# Patient Record
Sex: FEMALE | Race: WHITE
[De-identification: names, ages, dates, MRNs, and addresses within clinical notes are randomized per-mention and may not be internally consistent; named-entity substitution may affect disease eponyms.]

---

## 2017-10-20 ENCOUNTER — APPOINTMENT (OUTPATIENT)
Dept: DERMATOLOGY | Facility: CLINIC | Age: 82
End: 2017-10-20
Payer: MEDICARE

## 2017-10-20 PROCEDURE — 99213 OFFICE O/P EST LOW 20 MIN: CPT

## 2018-01-15 ENCOUNTER — APPOINTMENT (OUTPATIENT)
Dept: DERMATOLOGY | Facility: CLINIC | Age: 83
End: 2018-01-15
Payer: MEDICARE

## 2018-01-15 PROCEDURE — 99213 OFFICE O/P EST LOW 20 MIN: CPT

## 2018-05-14 ENCOUNTER — APPOINTMENT (OUTPATIENT)
Dept: DERMATOLOGY | Facility: CLINIC | Age: 83
End: 2018-05-14
Payer: MEDICARE

## 2018-05-14 PROCEDURE — 99213 OFFICE O/P EST LOW 20 MIN: CPT

## 2018-12-04 ENCOUNTER — OTHER (OUTPATIENT)
Age: 83
End: 2018-12-04

## 2018-12-04 DIAGNOSIS — E04.2 NONTOXIC MULTINODULAR GOITER: ICD-10-CM

## 2018-12-19 ENCOUNTER — APPOINTMENT (OUTPATIENT)
Dept: ENDOCRINOLOGY | Facility: CLINIC | Age: 83
End: 2018-12-19

## 2019-01-03 ENCOUNTER — RECORD ABSTRACTING (OUTPATIENT)
Age: 84
End: 2019-01-03

## 2019-01-03 DIAGNOSIS — Z86.69 PERSONAL HISTORY OF OTHER DISEASES OF THE NERVOUS SYSTEM AND SENSE ORGANS: ICD-10-CM

## 2019-01-03 DIAGNOSIS — Z86.39 PERSONAL HISTORY OF OTHER ENDOCRINE, NUTRITIONAL AND METABOLIC DISEASE: ICD-10-CM

## 2019-01-03 DIAGNOSIS — M81.0 AGE-RELATED OSTEOPOROSIS W/OUT CURRENT PATHOLOGICAL FRACTURE: ICD-10-CM

## 2019-01-03 DIAGNOSIS — Z86.79 PERSONAL HISTORY OF OTHER DISEASES OF THE CIRCULATORY SYSTEM: ICD-10-CM

## 2019-01-03 DIAGNOSIS — Z87.19 PERSONAL HISTORY OF OTHER DISEASES OF THE DIGESTIVE SYSTEM: ICD-10-CM

## 2019-01-03 DIAGNOSIS — Z82.0 FAMILY HISTORY OF EPILEPSY AND OTHER DISEASES OF THE NERVOUS SYSTEM: ICD-10-CM

## 2019-01-03 RX ORDER — ALENDRONATE SODIUM 70 MG/1
70 TABLET ORAL
Refills: 0 | Status: ACTIVE | COMMUNITY

## 2019-01-03 RX ORDER — DOCUSATE SODIUM 100 MG/1
100 CAPSULE ORAL
Refills: 0 | Status: ACTIVE | COMMUNITY

## 2019-01-09 ENCOUNTER — APPOINTMENT (OUTPATIENT)
Dept: ENDOCRINOLOGY | Facility: CLINIC | Age: 84
End: 2019-01-09
Payer: MEDICARE

## 2019-01-09 VITALS
WEIGHT: 125 LBS | BODY MASS INDEX: 25.2 KG/M2 | HEART RATE: 54 BPM | HEIGHT: 59 IN | SYSTOLIC BLOOD PRESSURE: 100 MMHG | DIASTOLIC BLOOD PRESSURE: 62 MMHG | OXYGEN SATURATION: 98 %

## 2019-01-09 DIAGNOSIS — E21.3 HYPERPARATHYROIDISM, UNSPECIFIED: ICD-10-CM

## 2019-01-09 PROCEDURE — 99214 OFFICE O/P EST MOD 30 MIN: CPT

## 2019-01-09 NOTE — REVIEW OF SYSTEMS
[Fatigue] : fatigue [SOB on Exertion] : shortness of breath during exertion [Constipation] : constipation [Dry Skin] : dry skin [Depression] : depression [Cold Intolerance] : cold intolerant [Swelling] : swelling [Decreased Appetite] : appetite not decreased [Recent Weight Gain (___ Lbs)] : no recent weight gain [Recent Weight Loss (___ Lbs)] : no recent weight loss [Visual Field Defect] : no visual field defect [Blurry Vision] : no blurred vision [Dysphagia] : no dysphagia [Dysphonia] : no dysphonia [Neck Pain] : no neck pain [Chest Pain] : no chest pain [Palpitations] : no palpitations [Diarrhea] : no diarrhea [Hair Loss] : no hair loss [Headache] : no headaches [Tremors] : no tremors [Anxiety] : no anxiety [Heat Intolerance] : heat tolerant [Easy Bruising] : no tendency for easy bruising [FreeTextEntry2] : weight stable [de-identified] : some days better then others [de-identified] : julio ankle and feet

## 2019-01-09 NOTE — REASON FOR VISIT
[Follow-Up: _____] : a [unfilled] follow-up visit [Other: _____] : [unfilled] [FreeTextEntry1] : Hyperparathyroidism, osteoporosis, vitamin D deficiency

## 2019-01-09 NOTE — ASSESSMENT
[FreeTextEntry1] : 84 y/o female with Hyperparathyroidism, osteoporosis, vitamin D deficiency. Labs reviewed from 11/8/19 - Calcium 10.4, A1C 5.3%, TSH 0.115 and vitamin D 58.2. \par \par Plan:\par Hyperparathyroidism: monitor labs\par - continue current dose of Sensipar 30 mg \par \par Vitamin D Deficiency: continue vitamin D supplement\par \par Osteoporosis: continue Fosamax \par \par Labs and follow up visit in 4 months\par \par Plan reviewed with Dr. Coleman.

## 2019-01-09 NOTE — PHYSICAL EXAM
[Alert] : alert [Well Nourished] : well nourished [Well Developed] : well developed [Supple] : the neck was supple [No LAD] : no lymphadenopathy [Thyroid Not Enlarged] : the thyroid was not enlarged [Normal Rate and Effort] : normal respiratory rhythm and effort [No Accessory Muscle Use] : no accessory muscle use [Clear to Auscultation] : lungs were clear to auscultation bilaterally [Normal Rate] : heart rate was normal  [Normal S1, S2] : normal S1 and S2 [Regular Rhythm] : with a regular rhythm [Acanthosis Nigricans] : no acanthosis nigricans [No Tremors] : no tremors [de-identified] : hard of hearing [de-identified] : 1 + edema in julio ankles [de-identified] : ambulates with walker [de-identified] : impaired recent and remote memory

## 2019-01-09 NOTE — HISTORY OF PRESENT ILLNESS
[FreeTextEntry1] : Pt. currently resides at [facility]. Pt. has Dementia. Niece answered all questions. \par \par Quality: Hyperparathyroidism, elevated calcium  \par Severity: mild\par Duration: 4 years\par Modifying Factors:\par Associated Symptoms:\par \par Current Regimen:\par Sensipar 30 mg \par Vitamin D 1000 units daily\par \par \par Date of last eye exam:\par Date of last foot exam:\par Date of last flu vaccine: 2018\par Date of last Pneumovax: unsure

## 2019-02-06 ENCOUNTER — OTHER (OUTPATIENT)
Age: 84
End: 2019-02-06

## 2019-05-22 ENCOUNTER — APPOINTMENT (OUTPATIENT)
Dept: ENDOCRINOLOGY | Facility: CLINIC | Age: 84
End: 2019-05-22
Payer: MEDICARE

## 2019-05-22 VITALS
SYSTOLIC BLOOD PRESSURE: 110 MMHG | WEIGHT: 110 LBS | HEART RATE: 57 BPM | BODY MASS INDEX: 22.18 KG/M2 | HEIGHT: 59 IN | DIASTOLIC BLOOD PRESSURE: 70 MMHG

## 2019-05-22 DIAGNOSIS — M81.0 AGE-RELATED OSTEOPOROSIS W/OUT CURRENT PATHOLOGICAL FRACTURE: ICD-10-CM

## 2019-05-22 PROCEDURE — 99214 OFFICE O/P EST MOD 30 MIN: CPT

## 2019-05-22 NOTE — ASSESSMENT
[FreeTextEntry1] : 1. Hyperparathyroidism, worsening. ADvise increasing sensipar to 30 mg bid, and discontinuation of oral calcium supplements\par 2. Recent fracture despite ongoing therapy with fosamax. Chronic prednisone use an aggravating factor. Advise discontinuation of fosamax, and starting prolia, 60 mg subQ every 6 months\par 3. Repeat blood testing in one month. Order provided.

## 2019-05-22 NOTE — PHYSICAL EXAM
[No Thyroid Nodules] : there were no palpable thyroid nodules [Clear to Auscultation] : lungs were clear to auscultation bilaterally [Regular Rhythm] : with a regular rhythm

## 2019-05-22 NOTE — HISTORY OF PRESENT ILLNESS
[FreeTextEntry1] : Pt. currently resides at [facility]. Pt. has Dementia. Niece answered all questions. \par \par Quality: Hyperparathyroidism, elevated calcium  \par Severity: mild\par Duration: 4 years\par Modifying Factors:sensipar,\par Associated Symptoms:osteoporosis\par \par Current Regimen:\par Sensipar 30 mg \par Vitamin D 1000 units daily\par \par PMH:\par Myasthenia Gravis, on prednisone 10 mg qod\par osteoporosis, s/p left hip fracture, on fosamax. REcent fracture of pelvis\par dementia\par meningioma\par multinodular thyroid\par

## 2019-11-06 ENCOUNTER — APPOINTMENT (OUTPATIENT)
Dept: ENDOCRINOLOGY | Facility: CLINIC | Age: 84
End: 2019-11-06
Payer: MEDICARE

## 2019-11-06 VITALS
DIASTOLIC BLOOD PRESSURE: 70 MMHG | HEIGHT: 59 IN | BODY MASS INDEX: 21.17 KG/M2 | WEIGHT: 105 LBS | SYSTOLIC BLOOD PRESSURE: 110 MMHG | HEART RATE: 90 BPM

## 2019-11-06 DIAGNOSIS — E87.6 HYPOKALEMIA: ICD-10-CM

## 2019-11-06 DIAGNOSIS — E55.9 VITAMIN D DEFICIENCY, UNSPECIFIED: ICD-10-CM

## 2019-11-06 DIAGNOSIS — E21.0 PRIMARY HYPERPARATHYROIDISM: ICD-10-CM

## 2019-11-06 DIAGNOSIS — E04.2 NONTOXIC MULTINODULAR GOITER: ICD-10-CM

## 2019-11-06 DIAGNOSIS — Z79.52 LONG TERM (CURRENT) USE OF SYSTEMIC STEROIDS: ICD-10-CM

## 2019-11-06 PROCEDURE — 99214 OFFICE O/P EST MOD 30 MIN: CPT

## 2019-11-06 RX ORDER — MIRTAZAPINE 15 MG/1
15 TABLET, FILM COATED ORAL
Refills: 0 | Status: ACTIVE | COMMUNITY

## 2019-11-06 RX ORDER — PYRIDOSTIGMINE BROMIDE 60 MG/1
60 TABLET ORAL
Refills: 0 | Status: ACTIVE | COMMUNITY

## 2019-11-06 RX ORDER — INDAPAMIDE 1.25 MG/1
1.25 TABLET, FILM COATED ORAL
Refills: 0 | Status: ACTIVE | COMMUNITY

## 2019-11-06 RX ORDER — SIMVASTATIN 10 MG/1
10 TABLET, FILM COATED ORAL
Refills: 0 | Status: ACTIVE | COMMUNITY

## 2019-11-06 RX ORDER — QUETIAPINE 25 MG/1
25 TABLET, FILM COATED ORAL
Refills: 0 | Status: ACTIVE | COMMUNITY

## 2019-11-06 RX ORDER — PYRIDOSTIGMINE BROMIDE 180 MG/1
180 TABLET ORAL
Refills: 0 | Status: ACTIVE | COMMUNITY

## 2019-11-06 RX ORDER — AZATHIOPRINE 50 1/1
50 TABLET ORAL
Refills: 0 | Status: ACTIVE | COMMUNITY

## 2019-11-06 RX ORDER — PREDNISONE 5 MG/1
5 TABLET ORAL EVERY OTHER DAY
Refills: 0 | Status: ACTIVE | COMMUNITY

## 2019-11-06 RX ORDER — DONEPEZIL HYDROCHLORIDE 10 MG/1
10 TABLET ORAL
Refills: 0 | Status: ACTIVE | COMMUNITY

## 2019-11-06 NOTE — HISTORY OF PRESENT ILLNESS
[FreeTextEntry1] : Pt. currently resides at [facility]. Pt. has Dementia. Niece answered all questions. \par \par Quality: Hyperparathyroidism, elevated calcium  \par Severity: mild\par Duration: 4 years\par Modifying Factors:sensipar,\par Associated Symptoms:osteoporosis\par \par Current Regimen:\par Sensipar 30 mg \par Vitamin D 1000 units daily\par \par PMH:\par Myasthenia Gravis, on prednisone 10 mg qod\par osteoporosis, s/p left hip fracture, on fosamax. REcent fracture of pelvis\par dementia\par meningioma\par multinodular thyroid\par

## 2019-11-06 NOTE — ASSESSMENT
[FreeTextEntry1] : 1. Hyperparathyroidism, stable on sensipar\par 2. Hypokalemia likely due to indapamide\par 3. 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 Chronic steroid use; r/o secondary adrenal insufficiency on alternate days.\par \par Suggest:\par continue sensipar at the current dose\par avoid vitamin D supplements\par repeat labs, which should be drawn prior to the prednisone dose; order to be provided

## 2019-11-19 ENCOUNTER — RX RENEWAL (OUTPATIENT)
Age: 84
End: 2019-11-19

## 2019-11-19 RX ORDER — CINACALCET 30 MG/1
30 TABLET ORAL TWICE DAILY
Qty: 180 | Refills: 0 | Status: ACTIVE | COMMUNITY
Start: 1900-01-01 | End: 1900-01-01

## 2020-02-10 RX ORDER — POTASSIUM CHLORIDE 750 MG/1
10 CAPSULE, EXTENDED RELEASE ORAL
Qty: 90 | Refills: 1 | Status: ACTIVE | COMMUNITY
Start: 2019-11-19 | End: 1900-01-01

## 2020-05-04 ENCOUNTER — APPOINTMENT (OUTPATIENT)
Dept: ENDOCRINOLOGY | Facility: CLINIC | Age: 85
End: 2020-05-04